# Patient Record
Sex: FEMALE | Race: WHITE | URBAN - METROPOLITAN AREA
[De-identification: names, ages, dates, MRNs, and addresses within clinical notes are randomized per-mention and may not be internally consistent; named-entity substitution may affect disease eponyms.]

---

## 2021-02-14 ENCOUNTER — IMMUNIZATION (OUTPATIENT)
Dept: PRIMARY CARE CLINIC | Age: 86
End: 2021-02-14
Payer: MEDICARE

## 2021-02-14 PROCEDURE — 0001A COVID-19, PFIZER VACCINE 30MCG/0.3ML DOSE: CPT | Performed by: FAMILY MEDICINE

## 2021-02-14 PROCEDURE — 91300 COVID-19, PFIZER VACCINE 30MCG/0.3ML DOSE: CPT | Performed by: FAMILY MEDICINE

## 2021-03-09 ENCOUNTER — IMMUNIZATION (OUTPATIENT)
Dept: PRIMARY CARE CLINIC | Age: 86
End: 2021-03-09
Payer: MEDICARE

## 2021-03-09 PROCEDURE — 91300 COVID-19, PFIZER VACCINE 30MCG/0.3ML DOSE: CPT | Performed by: PHYSICIAN ASSISTANT

## 2021-03-09 PROCEDURE — 0002A PR IMM ADMN SARSCOV2 30MCG/0.3ML DIL RECON 2ND DOSE: CPT | Performed by: PHYSICIAN ASSISTANT

## 2024-02-21 ENCOUNTER — EVALUATION (OUTPATIENT)
Dept: PHYSICAL THERAPY | Age: 89
End: 2024-02-21
Payer: COMMERCIAL

## 2024-02-21 DIAGNOSIS — M51.37 DEGENERATION OF LUMBAR OR LUMBOSACRAL INTERVERTEBRAL DISC: ICD-10-CM

## 2024-02-21 DIAGNOSIS — M47.817 SPONDYLOSIS WITHOUT MYELOPATHY OR RADICULOPATHY, LUMBOSACRAL REGION: ICD-10-CM

## 2024-02-21 DIAGNOSIS — S32.030A CLOSED WEDGE COMPRESSION FRACTURE OF L3 VERTEBRA, INITIAL ENCOUNTER (HCC): Primary | ICD-10-CM

## 2024-02-21 PROCEDURE — 97161 PT EVAL LOW COMPLEX 20 MIN: CPT | Performed by: PHYSICAL THERAPIST

## 2024-02-21 NOTE — PROGRESS NOTES
[facility]          Phone: [phone] Fax: [phone]    Physical Therapy Daily Treatment Note  Date:  2024    Patient Name:  Norma Perry    :  1935  MRN: [id number]    Evaluating Therapist:  Linda Cruz, PT [id number]    Restrictions/Precautions:    Diagnosis:     Diagnosis Orders   1. Closed wedge compression fracture of L3 vertebra, initial encounter ([facility])        2. Spondylosis without myelopathy or radiculopathy, lumbosacral region        3. Degeneration of lumbar or lumbosacral intervertebral disc          Treatment Diagnosis:    Insurance/Certification information:  BCBS/Medicare  24 to 24  Referring Physician:  AUBREY Soliman  Plan of care signed (Y/N):    Visit# / total visits:  -  Pain level: 2/10   Time In:  1130  Time Out:  1155    Subjective:  See evaluation    Exercises:  Exercise/Equipment Resistance/Repetitions Other comments     Bike/stepper       Lumbar extension at wall       Hamstring stretch       Standing hip 3-way       Core stability seated on ball                                                                                                            Other Therapeutic Activities:  PT evaluation completed    Home Exercise Program:  N/A    Manual Treatments:  N/A    Modalities:  IFC PRN     Time-in Time-out Total Time   98717  Evaluation Low Complexity 1130 1155 25   65720  Evaluation Med Complexity      97547  Evaluation High Complexity      71426  Ther Ex      97930  Neuro Re-ed        87495  Ther Activities        44843  Manual Therapy       97262  E-stim       16236  Ultrasound            Session 1130 1155 25       Treatment/Activity Tolerance:  [x] Patient tolerated treatment well [] Patient limited by fatigue  [] Patient limited by pain  [] Patient limited by other medical complications  [] Other:     Prognosis: [] Good [x] Fair  [] Poor    Patient Requires Follow-up: [x] Yes  [] No    Plan:   [] Continue 
Neuroscience [x] Cold/hotpack  [] Vasocompression  [x] Electrical Stimulation  [] Lumbar/Cervical Traction  [] Ultrasound   [] Iontophoresis: 4 mg/mL Dexamethasone Sodium Phosphate 40-80 mAmin  [] Dry Needling      [x] Instruction in HEP      []  Medication allergies reviewed for use of Dexamethasone Sodium Phosphate 4mg/ml  with iontophoresis treatments.  Patient is not allergic.      The following CPT codes are likely to be used in the care of this patient: 28154 PT Evaluation: Low Complexity, 79221 PT Re-Evaluation, 51044 Therapeutic Exercise, 21742 Neuromuscular Re-Education, 21677 Therapeutic Activities, 79528 Manual Therapy, and 29159 Electric Stimulation      Suggested Professional Referral: [x] No  [] Yes:     Patient Education:  [x] Plans/Goals, Risks/Benefits discussed  [x] Home exercise program  Method of Education: [x] Verbal  [x] Demo  [x] Written  Comprehension of Education:  [x] Verbalizes understanding.  [x] Demonstrates understanding.  [] Needs Review.  [] Demonstrates/verbalizes understanding of HEP/Ed previously given.    Frequency:  2 days per week for 4-6 weeks    Patient understands diagnosis/prognosis and consents to treatment, plan and goals: [x] Yes    [] No     Thank you for the opportunity to work with your patient.  If you have questions or comments, please contact me at numbers listed above.    Electronically signed by: Linda Cruz, PT, [id number]    Medicare Patients Only     Please sign Physician's Certification and return to:  [facility] [place] [facility]ERAP[facility]  [address] [place] [address]  2ND FLOOR  [facility] [address]  Dept: [phone]  Dept Fax: [phone]  Loc: [phone]     Physician's Certification / Comments     Frequency/Duration 2 days per week for 4-6 weeks.   Certification period from 2/21/2024  to 5/20/24.    I have reviewed the Plan of Care established for skilled therapy services and certify that the services are

## 2024-02-26 ENCOUNTER — TREATMENT (OUTPATIENT)
Dept: PHYSICAL THERAPY | Age: 89
End: 2024-02-26
Payer: MEDICARE

## 2024-02-26 DIAGNOSIS — M51.37 DEGENERATION OF LUMBAR OR LUMBOSACRAL INTERVERTEBRAL DISC: ICD-10-CM

## 2024-02-26 DIAGNOSIS — S32.030A CLOSED WEDGE COMPRESSION FRACTURE OF L3 VERTEBRA, INITIAL ENCOUNTER (HCC): Primary | ICD-10-CM

## 2024-02-26 DIAGNOSIS — M47.817 SPONDYLOSIS WITHOUT MYELOPATHY OR RADICULOPATHY, LUMBOSACRAL REGION: ICD-10-CM

## 2024-02-26 PROCEDURE — 97110 THERAPEUTIC EXERCISES: CPT

## 2024-02-26 NOTE — PROGRESS NOTES
[facility]          Phone: [phone] Fax: [phone]    Physical Therapy Daily Treatment Note  Date:  2024    Patient Name:  Norma Perry    :  1935  MRN: [id number]    Evaluating Therapist:  Linda Cruz, PT [id number]    Restrictions/Precautions:  reduce flexion movement and flexion movement under load  Diagnosis:     Diagnosis Orders   1. Closed wedge compression fracture of L3 vertebra, initial encounter ([facility])        2. Spondylosis without myelopathy or radiculopathy, lumbosacral region        3. Degeneration of lumbar or lumbosacral intervertebral disc          Treatment Diagnosis:    Insurance/Certification information:  BCBS/Medicare  24 to 24  Referring Physician:  AUBREY Soliman  Plan of care signed (Y/N):    Visit# / total visits:  -  Pain level: 2/10   Time In:  1340  Time Out:  1410    Subjective: pt had no new reports today     Exercises:  Exercise/Equipment Resistance/Repetitions Other comments     Bike/X 10 mins      Lumbar extension at wall 3 sec x 10 reps       Hamstring stretch 20 sec x 2 reps B       Standing hip 3-way X 10 reps each B       Core stability seated on ball 30 sec x 2 reps  static w/ emphasis on TRA bracing SBA of therapist   X 5 reps reciprocating leg lifts cued for TRA bracing CGA of therapist       Sit to stand  2 x 5 reps  Emphasis on eccentric control                                                                                                   Other Therapeutic Activities:  pt tolerated introduction of TE and core stabilization.  Cuing / emphasis on eccentric control w/ stand to sit transfers, pt tends to plop. Educated pt on risks of additional compression fractures w/ plopping into chair. Pt reported understanding    Home Exercise Program:  N/A    Manual Treatments:  N/A    Modalities:  IFC PRN     Time-in Time-out Total Time   78551  Evaluation Low Complexity      08471  Evaluation Med

## 2024-02-28 ENCOUNTER — TREATMENT (OUTPATIENT)
Dept: PHYSICAL THERAPY | Age: 89
End: 2024-02-28
Payer: MEDICARE

## 2024-02-28 DIAGNOSIS — S32.030A CLOSED WEDGE COMPRESSION FRACTURE OF L3 VERTEBRA, INITIAL ENCOUNTER (HCC): Primary | ICD-10-CM

## 2024-02-28 DIAGNOSIS — M51.37 DEGENERATION OF LUMBAR OR LUMBOSACRAL INTERVERTEBRAL DISC: ICD-10-CM

## 2024-02-28 DIAGNOSIS — M47.817 SPONDYLOSIS WITHOUT MYELOPATHY OR RADICULOPATHY, LUMBOSACRAL REGION: ICD-10-CM

## 2024-02-28 PROCEDURE — 97110 THERAPEUTIC EXERCISES: CPT

## 2024-02-28 NOTE — PROGRESS NOTES
[facility]          Phone: [phone] Fax: [phone]    Physical Therapy Daily Treatment Note  Date:  2024    Patient Name:  Norma Perry    :  1935  MRN: [id number]    Evaluating Therapist:  Linda Cruz, BRIANNA [id number]    Restrictions/Precautions:  reduce flexion movement and flexion movement under load  Diagnosis:     Diagnosis Orders   1. Closed wedge compression fracture of L3 vertebra, initial encounter ([facility])        2. Spondylosis without myelopathy or radiculopathy, lumbosacral region        3. Degeneration of lumbar or lumbosacral intervertebral disc          Treatment Diagnosis:    Insurance/Certification information:  BCBS/Medicare  24 to 24  Referring Physician:  AUBREY Soliman  Plan of care signed (Y/N):    Visit# / total visits:  3/8-  Pain level: -/10   Time In:  1338  Time Out:  1411    Subjective: pt reported no pain in her LB, but reported her R hip is painful and rates it at 4/10. She didn't attribute this to any particular thing precipitating this pain.  Pt reported after her last session she had increased soreness and some back spasms, that she treated with heating pad.     Exercises:  Exercise/Equipment Resistance/Repetitions Other comments     Bike/X 10 mins      Lumbar extension at wall 3 sec x   x 5 reps  Decreased tolerance this date      Hamstring stretch 20 sec x 2 reps B       Standing hip 3-way X 10 reps each B       Core stability seated on ball 30 sec x 2 reps  static w/ emphasis on TRA bracing SBA of therapist   X 5 reps reciprocating leg lifts cued for TRA bracing CGA of therapist       Sit to stand  2 x 5 reps  Emphasis on eccentric control                                                                                                   Other Therapeutic Activities:  continues to report R hip pain, and LB soreness .  Pt was noted to have improved eccentric control w/ stand to sit transfers.     Home Exercise

## 2024-03-04 ENCOUNTER — TREATMENT (OUTPATIENT)
Dept: PHYSICAL THERAPY | Age: 89
End: 2024-03-04
Payer: MEDICARE

## 2024-03-04 DIAGNOSIS — M51.37 DEGENERATION OF LUMBAR OR LUMBOSACRAL INTERVERTEBRAL DISC: ICD-10-CM

## 2024-03-04 DIAGNOSIS — S32.030A CLOSED WEDGE COMPRESSION FRACTURE OF L3 VERTEBRA, INITIAL ENCOUNTER (HCC): Primary | ICD-10-CM

## 2024-03-04 DIAGNOSIS — M47.817 SPONDYLOSIS WITHOUT MYELOPATHY OR RADICULOPATHY, LUMBOSACRAL REGION: ICD-10-CM

## 2024-03-04 PROCEDURE — 97110 THERAPEUTIC EXERCISES: CPT

## 2024-03-04 NOTE — PROGRESS NOTES
stretch, standing lumbar ext, 3 way hip    Manual Treatments:  N/A    Modalities:  IFC PRN     Time-in Time-out Total Time   63955  Evaluation Low Complexity      03365  Evaluation Med Complexity      21636  Evaluation High Complexity      45019  Ther Ex 959 1035 36   93576  Neuro Re-ed        60446  Ther Activities        44697  Manual Therapy       63163  E-stim       70118  Ultrasound            Session 952 1035 36       Treatment/Activity Tolerance:  [x] Patient tolerated treatment well [] Patient limited by fatigue  [] Patient limited by pain  [] Patient limited by other medical complications  [] Other:     Prognosis: [] Good [x] Fair  [] Poor    Patient Requires Follow-up: [x] Yes  [] No    Plan:   [x] Continue per plan of care [] Alter current plan (see comments)  [] Plan of care initiated [] Hold pending MD visit [] Discharge  Plan for Next Session:        Electronically signed by:  Pilar Soliman, PTA [id number]

## 2024-03-08 ENCOUNTER — TREATMENT (OUTPATIENT)
Dept: PHYSICAL THERAPY | Age: 89
End: 2024-03-08

## 2024-03-08 DIAGNOSIS — S32.030A CLOSED WEDGE COMPRESSION FRACTURE OF L3 VERTEBRA, INITIAL ENCOUNTER (HCC): Primary | ICD-10-CM

## 2024-03-08 DIAGNOSIS — M51.37 DEGENERATION OF LUMBAR OR LUMBOSACRAL INTERVERTEBRAL DISC: ICD-10-CM

## 2024-03-08 DIAGNOSIS — M47.817 SPONDYLOSIS WITHOUT MYELOPATHY OR RADICULOPATHY, LUMBOSACRAL REGION: ICD-10-CM

## 2024-03-08 NOTE — PROGRESS NOTES
[facility]          Phone: [phone] Fax: [phone]    Physical Therapy Daily Treatment Note  Date:  3/8/2024    Patient Name:  Norma Perry    :  1935  MRN: [id number]    Evaluating Therapist:  Linda Cruz, PT [id number]    Restrictions/Precautions:  reduce flexion movement and flexion movement under load  Diagnosis:     Diagnosis Orders   1. Closed wedge compression fracture of L3 vertebra, initial encounter ([facility])        2. Spondylosis without myelopathy or radiculopathy, lumbosacral region        3. Degeneration of lumbar or lumbosacral intervertebral disc          Treatment Diagnosis:    Insurance/Certification information:  BCBS/Medicare  24 to 24  Referring Physician:  AUBREY Soliman  Plan of care signed (Y/N):    Visit# / total visits:  -  Pain level: 1/10   Time In:  921  Time Out:  951    Subjective: Pt without significant complaint of pain today.     Exercises:  Exercise/Equipment Resistance/Repetitions Other comments     Bike/X 10 mins      Lumbar extension at wall 3 sec x   x 5 reps  Decreased tolerance this date      Hamstring stretch 20 sec x 3 reps B       Standing hip 3-way X 10 reps each B       Core stability seated on ball 2-3 minutes static w/ emphasis on TRA bracing SBA of therapist     X 5 reps reciprocating leg lifts cued for TRA bracing CGA of therapist     Scapular retraction x 10 reps Cued for TRA bracing     Sit to stand  2 x 5 reps       rows RTB x 10 reps      Shoulder ext RTB x 10 reps                                                                                      Other Therapeutic Activities:       Home Exercise Program:   3/4/24 seated HS stretch, standing lumbar ext, 3 way hip    Manual Treatments:  N/A    Modalities:  IFC PRN     Time-in Time-out Total Time   83336  Evaluation Low Complexity      88801  Evaluation Med Complexity      27964  Evaluation High Complexity      57315  Ther Ex 0978 1775 30

## 2024-03-11 ENCOUNTER — TREATMENT (OUTPATIENT)
Dept: PHYSICAL THERAPY | Age: 89
End: 2024-03-11
Payer: MEDICARE

## 2024-03-11 DIAGNOSIS — M51.37 DEGENERATION OF LUMBAR OR LUMBOSACRAL INTERVERTEBRAL DISC: ICD-10-CM

## 2024-03-11 DIAGNOSIS — S32.030A CLOSED WEDGE COMPRESSION FRACTURE OF L3 VERTEBRA, INITIAL ENCOUNTER (HCC): Primary | ICD-10-CM

## 2024-03-11 DIAGNOSIS — M47.817 SPONDYLOSIS WITHOUT MYELOPATHY OR RADICULOPATHY, LUMBOSACRAL REGION: ICD-10-CM

## 2024-03-11 PROCEDURE — 97110 THERAPEUTIC EXERCISES: CPT

## 2024-03-11 NOTE — PROGRESS NOTES
[facility]          Phone: [phone] Fax: [phone]    Physical Therapy Daily Treatment Note  Date:  3/11/2024    Patient Name:  Norma Perry    :  1935  MRN: [id number]    Evaluating Therapist:  Linda Cruz, PT [id number]    Restrictions/Precautions:  reduce flexion movement and flexion movement under load  Diagnosis:     Diagnosis Orders   1. Closed wedge compression fracture of L3 vertebra, initial encounter ([facility])        2. Spondylosis without myelopathy or radiculopathy, lumbosacral region        3. Degeneration of lumbar or lumbosacral intervertebral disc          Treatment Diagnosis:    Insurance/Certification information:  BCBS/Medicare  24 to 24  Referring Physician:  AUBREY Soliman  Plan of care signed (Y/N):    Visit# / total visits:  -  Pain level: 1/10   Time In:  0920  Time Out:  950    Subjective: Pt without significant complaint of pain today.     Exercises:  Exercise/Equipment Resistance/Repetitions Other comments     Bike/X 10 mins      Lumbar extension at wall 3 sec x   x 5 reps  Decreased tolerance this date      Hamstring stretch 20 sec x 3 reps B       Standing hip 3-way X 10 reps each B       Core stability seated on ball 2-3 minutes static w/ emphasis on TRA bracing SBA of therapist     X 5 reps reciprocating leg lifts cued for TRA bracing CGA / Min of therapist     Scapular retraction x 10 reps Cued for TRA bracing     Sit to stand  2 x 5 reps       rows RTB x 10 reps      Shoulder ext RTB x 10 reps                                                                                      Other Therapeutic Activities:       Home Exercise Program:   3/4/24 seated HS stretch, standing lumbar ext, 3 way hip    Manual Treatments:  N/A    Modalities:  IFC PRN     Time-in Time-out Total Time   13480  Evaluation Low Complexity      96214  Evaluation Med Complexity      04961  Evaluation High Complexity      62729  Ther Ex 0963 950

## 2024-03-15 ENCOUNTER — TREATMENT (OUTPATIENT)
Dept: PHYSICAL THERAPY | Age: 89
End: 2024-03-15

## 2024-03-15 DIAGNOSIS — S32.030A CLOSED WEDGE COMPRESSION FRACTURE OF L3 VERTEBRA, INITIAL ENCOUNTER (HCC): Primary | ICD-10-CM

## 2024-03-15 DIAGNOSIS — M51.37 DEGENERATION OF LUMBAR OR LUMBOSACRAL INTERVERTEBRAL DISC: ICD-10-CM

## 2024-03-15 DIAGNOSIS — M47.817 SPONDYLOSIS WITHOUT MYELOPATHY OR RADICULOPATHY, LUMBOSACRAL REGION: ICD-10-CM

## 2024-03-15 NOTE — PROGRESS NOTES
34040  Ther Activities        53175  Manual Therapy       81946  E-stim       42016  Ultrasound            Session 6864 991 41       Treatment/Activity Tolerance:  [x] Patient tolerated treatment well [] Patient limited by fatigue  [] Patient limited by pain  [] Patient limited by other medical complications  [] Other:     Prognosis: [] Good [x] Fair  [] Poor    Patient Requires Follow-up: [x] Yes  [] No    Plan:   [x] Continue per plan of care [] Alter current plan (see comments)  [] Plan of care initiated [] Hold pending MD visit [] Discharge  Plan for Next Session:        Electronically signed by:  Linda Cruz, PT, [id number]

## 2024-03-18 ENCOUNTER — TREATMENT (OUTPATIENT)
Dept: PHYSICAL THERAPY | Age: 89
End: 2024-03-18
Payer: MEDICARE

## 2024-03-18 DIAGNOSIS — S32.030A CLOSED WEDGE COMPRESSION FRACTURE OF L3 VERTEBRA, INITIAL ENCOUNTER (HCC): Primary | ICD-10-CM

## 2024-03-18 DIAGNOSIS — M47.817 SPONDYLOSIS WITHOUT MYELOPATHY OR RADICULOPATHY, LUMBOSACRAL REGION: ICD-10-CM

## 2024-03-18 DIAGNOSIS — M51.37 DEGENERATION OF LUMBAR OR LUMBOSACRAL INTERVERTEBRAL DISC: ICD-10-CM

## 2024-03-18 PROCEDURE — 97110 THERAPEUTIC EXERCISES: CPT

## 2024-03-18 NOTE — PROGRESS NOTES
[facility]          Phone: [phone] Fax: [phone]    Physical Therapy Daily Treatment Note  Date:  3/18/2024    Patient Name:  Norma Perry    :  1935  MRN: [id number]    Evaluating Therapist:  Linda Cruz, PT [id number]    Restrictions/Precautions:  reduce flexion movement and flexion movement under load  Diagnosis:     Diagnosis Orders   1. Closed wedge compression fracture of L3 vertebra, initial encounter ([facility])        2. Spondylosis without myelopathy or radiculopathy, lumbosacral region        3. Degeneration of lumbar or lumbosacral intervertebral disc          Treatment Diagnosis:    Insurance/Certification information:  BCBS/Medicare  24 to 24  Referring Physician:  AUBREY Soliman  Plan of care signed (Y/N):    Visit# / total visits:  -  Pain level: 1/10   Time In:  0911  Time Out:  940    Subjective: Pt without significant complaint of pain today.     Exercises:  Exercise/Equipment Resistance/Repetitions Other comments     Bike/X 10 mins      Lumbar extension at wall 3 sec x 10 reps           Hamstring stretch 20 sec x 3 reps B       Standing hip 3-way X 10 reps each B       Core stability seated on ball 2-3 minutes static w/ emphasis on TRA bracing SBA of therapist     X 5 reps reciprocating leg lifts cued for TRA bracing CGA / Min of therapist     Scapular retraction x 10 reps SBA Cued for TRA bracing     Sit to stand   x 10 reps       rows RTB x 10 reps      Shoulder ext RTB x 10 reps                                                                                      Other Therapeutic Activities:       Home Exercise Program:   3/4/24 seated HS stretch, standing lumbar ext, 3 way hip    Manual Treatments:  N/A    Modalities:  IFC PRN     Time-in Time-out Total Time   79391  Evaluation Low Complexity      90990  Evaluation Med Complexity      76573  Evaluation High Complexity      14136  Ther Ex 0911 940 29   94501  Neuro Re-ed

## 2024-03-22 ENCOUNTER — TREATMENT (OUTPATIENT)
Dept: PHYSICAL THERAPY | Age: 89
End: 2024-03-22

## 2024-03-22 DIAGNOSIS — M51.37 DEGENERATION OF LUMBAR OR LUMBOSACRAL INTERVERTEBRAL DISC: ICD-10-CM

## 2024-03-22 DIAGNOSIS — M47.817 SPONDYLOSIS WITHOUT MYELOPATHY OR RADICULOPATHY, LUMBOSACRAL REGION: ICD-10-CM

## 2024-03-22 DIAGNOSIS — S32.030A CLOSED WEDGE COMPRESSION FRACTURE OF L3 VERTEBRA, INITIAL ENCOUNTER (HCC): Primary | ICD-10-CM

## 2024-03-22 NOTE — PROGRESS NOTES
[facility]          Phone: [phone] Fax: [phone]    Physical Therapy Daily Treatment Note  Date:  3/22/2024    Patient Name:  Norma Perry    :  1935  MRN: [id number]    Evaluating Therapist:  Linda Cruz, PT [id number]    Restrictions/Precautions:  reduce flexion movement and flexion movement under load  Diagnosis:     Diagnosis Orders   1. Closed wedge compression fracture of L3 vertebra, initial encounter ([facility])        2. Spondylosis without myelopathy or radiculopathy, lumbosacral region        3. Degeneration of lumbar or lumbosacral intervertebral disc          Treatment Diagnosis:    Insurance/Certification information:  BCBS/Medicare  24 to 24  Referring Physician:  AUBREY Soliman  Plan of care signed (Y/N):    Visit# / total visits:  -  Pain level: 1/10   Time In:  923  Time Out:  953    Subjective: Pt without significant complaint of pain today.     Exercises:  Exercise/Equipment Resistance/Repetitions Other comments     Bike/X 10 mins      Lumbar extension at wall 3 sec x 10 reps           Hamstring stretch 20 sec x 3 reps B       Standing hip 3-way X 10 reps each B       Core stability seated on ball 2-3 minutes static w/ emphasis on TRA bracing SBA of therapist     X 5 reps reciprocating leg lifts cued for TRA bracing CGA / Min of therapist     Scapular retraction x 10 reps SBA Cued for TRA bracing     Sit to stand   x 10 reps       rows RTB x 10 reps      Shoulder ext RTB x 10 reps                                                                                      Other Therapeutic Activities:       Home Exercise Program:   3/4/24 seated HS stretch, standing lumbar ext, 3 way hip    Manual Treatments:  N/A    Modalities:  IFC PRN     Time-in Time-out Total Time   85060  Evaluation Low Complexity      84759  Evaluation Med Complexity      81391  Evaluation High Complexity      60314  Ther Ex 0902 0953 30   72330  Neuro

## 2024-03-29 ENCOUNTER — TREATMENT (OUTPATIENT)
Dept: PHYSICAL THERAPY | Age: 89
End: 2024-03-29

## 2024-03-29 DIAGNOSIS — S32.030A CLOSED WEDGE COMPRESSION FRACTURE OF L3 VERTEBRA, INITIAL ENCOUNTER (HCC): Primary | ICD-10-CM

## 2024-03-29 DIAGNOSIS — M47.817 SPONDYLOSIS WITHOUT MYELOPATHY OR RADICULOPATHY, LUMBOSACRAL REGION: ICD-10-CM

## 2024-03-29 DIAGNOSIS — M51.37 DEGENERATION OF LUMBAR OR LUMBOSACRAL INTERVERTEBRAL DISC: ICD-10-CM

## 2024-03-29 NOTE — PROGRESS NOTES
[facility]                Phone: [phone] Fax: [phone]    Physical Therapy  Outpatient Discharge Summary     Date:  3/29/2024    Patient Name:  Norma Perry    :  1935  MRN: [id number]    DIAGNOSIS:     Diagnosis Orders   1. Closed wedge compression fracture of L3 vertebra, initial encounter ([facility])        2. Spondylosis without myelopathy or radiculopathy, lumbosacral region        3. Degeneration of lumbar or lumbosacral intervertebral disc          REFERRING PHYSICIAN:  DOLORES Soliman-DEEPTHI    ATTENDANCE:  Pt has attended 10 of 10 scheduled treatments from 24 to 3/29/24.  TREATMENTS RECEIVED:  stretching, ROM, strength training, education in a HEP    INITIAL STATUS:  Pain in lumbar spine 2/10 to 7-8/10  ROM lumbar extension lacking 10° from neutral  Strength B LEs 4/5  Pt with a hunched over, flexed posture of trunk with standing and gait  Oswestry 44% disability    FINAL STATUS:  Pain lumbar spine 0/10 to 5-6/10  ROM lumbar extension to neutral  Strength B LEs 5/5  Functional posture not significantly changed since evaluation  Oswestry: 46% disability  Pt is independent with HEP    GOALS:  3 out of 6 Long Term Goals were obtained.    LONG TERM GOALS NOT OBTAINED/REASON:  ROM, strength, and HEP goals met. Pain improved since evaluation, but not at goal. Functional posture and self reporting of tolerance to functional activities not changed since evaluation.    PATIENT GOALS:  pain relief, improved endurance    REASON FOR DISCHARGE:  Pt has received maximum benefit from physical therapy.    PATIENT EDUCATION/INSTRUCTIONS:  Pt educated in stretches and strength training activities for HEP.    RECOMMENDATIONS:  Discharge to HEP with recommendation to foloow-up with physician PRN due to continued pain.        Thank you for the opportunity to work with your patient. If you have questions or comments, please feel free to contact me by phone or

## 2024-03-29 NOTE — PROGRESS NOTES
[facility]          Phone: [phone] Fax: [phone]    Physical Therapy Daily Treatment Note  Date:  3/29/2024    Patient Name:  Norma Perry    :  1935  MRN: [id number]    Evaluating Therapist:  Linda Cruz, PT [id number]    Restrictions/Precautions:  reduce flexion movement and flexion movement under load  Diagnosis:     Diagnosis Orders   1. Closed wedge compression fracture of L3 vertebra, initial encounter ([facility])        2. Spondylosis without myelopathy or radiculopathy, lumbosacral region        3. Degeneration of lumbar or lumbosacral intervertebral disc          Treatment Diagnosis:    Insurance/Certification information:  BCBS/Medicare  24 to 24  Referring Physician:  AUBREY Soliman  Plan of care signed (Y/N):    Visit# / total visits:  10/8-  Pain level: 0/10   Time In:  919  Time Out:  930    Subjective: Pt without complaint of pain today. Reports pain at the worst over the past 2 weeks has been around 4-5/10.    Exercises:  Exercise/Equipment Resistance/Repetitions Other comments                                                                                                              Other:  Measurement obtained for discharge. See discharge summary for details.       Home Exercise Program:   3/4/24 seated HS stretch, standing lumbar ext, 3 way hip    Manual Treatments:  N/A    Modalities:  IFC PRN     Time-in Time-out Total Time   66555  Evaluation Low Complexity      69067  Evaluation Med Complexity      97733  Evaluation High Complexity      80008  Ther Ex 91930 11   67522  Neuro Re-ed        43043  Ther Activities        44100  Manual Therapy       87445  E-stim       38946  Ultrasound            Session 919       Treatment/Activity Tolerance:  [x] Patient tolerated treatment well [] Patient limited by fatigue  [] Patient limited by pain  [] Patient limited by other medical complications  [] Other:

## 2024-10-03 ENCOUNTER — HOSPITAL ENCOUNTER (OUTPATIENT)
Age: 89
Discharge: HOME | End: 2024-10-03
Payer: MEDICARE

## 2024-10-03 LAB
25(OH)D3 SERPL-MCNC: 36.2 NG/ML (ref 30–100)
ALBUMIN SERPL-MCNC: 4.5 G/DL (ref 3.5–5.2)
ALP SERPL-CCNC: 105 U/L (ref 35–104)
ALT SERPL-CCNC: 10 U/L (ref 0–32)
ANION GAP SERPL CALCULATED.3IONS-SCNC: 12 MMOL/L (ref 7–16)
AST SERPL-CCNC: 16 U/L (ref 0–31)
BILIRUB SERPL-MCNC: 0.4 MG/DL (ref 0–1.2)
BUN SERPL-MCNC: 24 MG/DL (ref 6–23)
CALCIUM SERPL-MCNC: 9.6 MG/DL (ref 8.6–10.2)
CHLORIDE SERPL-SCNC: 101 MMOL/L (ref 98–107)
CHOLEST SERPL-MCNC: 223 MG/DL
CO2 SERPL-SCNC: 25 MMOL/L (ref 22–29)
CREAT SERPL-MCNC: 0.6 MG/DL (ref 0.5–1)
ERYTHROCYTE [DISTWIDTH] IN BLOOD BY AUTOMATED COUNT: 11.9 % (ref 11.5–15)
GFR, ESTIMATED: 86 ML/MIN/1.73M2
GLUCOSE SERPL-MCNC: 105 MG/DL (ref 74–99)
HCT VFR BLD AUTO: 40.6 % (ref 34–48)
HDLC SERPL-MCNC: 77 MG/DL
HGB BLD-MCNC: 13.2 G/DL (ref 11.5–15.5)
LDLC SERPL CALC-MCNC: 129 MG/DL
MCH RBC QN AUTO: 31 PG (ref 26–35)
MCHC RBC AUTO-ENTMCNC: 32.5 G/DL (ref 32–34.5)
MCV RBC AUTO: 95.3 FL (ref 80–99.9)
PLATELET # BLD AUTO: 171 K/UL (ref 130–450)
PMV BLD AUTO: 9.2 FL (ref 7–12)
POTASSIUM SERPL-SCNC: 4.3 MMOL/L (ref 3.5–5)
PROT SERPL-MCNC: 7.3 G/DL (ref 6.4–8.3)
RBC # BLD AUTO: 4.26 M/UL (ref 3.5–5.5)
SODIUM SERPL-SCNC: 138 MMOL/L (ref 132–146)
TRIGL SERPL-MCNC: 83 MG/DL
TSH SERPL DL<=0.05 MIU/L-ACNC: 3.34 UIU/ML (ref 0.27–4.2)
VLDLC SERPL CALC-MCNC: 17 MG/DL
WBC OTHER # BLD: 7.2 K/UL (ref 4.5–11.5)

## 2024-10-03 PROCEDURE — 36415 COLL VENOUS BLD VENIPUNCTURE: CPT

## 2024-10-03 PROCEDURE — 84443 ASSAY THYROID STIM HORMONE: CPT

## 2024-10-03 PROCEDURE — 85027 COMPLETE CBC AUTOMATED: CPT

## 2024-10-03 PROCEDURE — 82306 VITAMIN D 25 HYDROXY: CPT

## 2024-10-03 PROCEDURE — 80061 LIPID PANEL: CPT

## 2024-10-03 PROCEDURE — 80053 COMPREHEN METABOLIC PANEL: CPT
